# Patient Record
Sex: FEMALE | Race: WHITE | NOT HISPANIC OR LATINO | Employment: UNEMPLOYED | URBAN - METROPOLITAN AREA
[De-identification: names, ages, dates, MRNs, and addresses within clinical notes are randomized per-mention and may not be internally consistent; named-entity substitution may affect disease eponyms.]

---

## 2019-09-05 PROBLEM — R76.8 POSITIVE ANA (ANTINUCLEAR ANTIBODY): Status: ACTIVE | Noted: 2019-09-05

## 2019-09-05 PROBLEM — M15.0 PRIMARY OSTEOARTHRITIS INVOLVING MULTIPLE JOINTS: Status: ACTIVE | Noted: 2019-09-05

## 2019-09-05 PROBLEM — M15.9 PRIMARY OSTEOARTHRITIS INVOLVING MULTIPLE JOINTS: Status: ACTIVE | Noted: 2019-09-05

## 2019-09-05 PROBLEM — L40.9 PSORIASIS: Status: ACTIVE | Noted: 2019-09-05

## 2019-12-05 PROBLEM — Z79.631 METHOTREXATE, LONG TERM, CURRENT USE: Status: ACTIVE | Noted: 2019-12-05

## 2020-04-23 ENCOUNTER — TELEPHONE (OUTPATIENT)
Dept: PHARMACY | Facility: CLINIC | Age: 50
End: 2020-04-23

## 2020-04-23 PROBLEM — L40.50 PSORIATIC ARTHRITIS: Status: ACTIVE | Noted: 2019-09-05

## 2020-04-27 NOTE — TELEPHONE ENCOUNTER
DOCUMENTATION ONLY:  Prior authorization for Cosentyx approved from 04/23/20 to 10/23/20    Case Id: [id number]    Co-pay: $0    Patient Assistance IS required

## 2020-04-28 NOTE — TELEPHONE ENCOUNTER
Initial Cosentyx SensoReady Pens 150mg consult completed on . Cosentyx SensoReady Pens 150mg will be shipped on  to arrive at patient's home on  via [employer]. $0 copay. Patient will start Cosentyx SensoReady Pens 150mg on . Address confirmed, CC on file. Confirmed 2 patient identifiers - name and . Therapy Appropriate.    - Cosentyx SensoReady Pens 150mg:  Inject 1 pen (150mg) every week x 5 weeks, then 1 pen (150mg) every 4 weeks.    -Storage: Refrigerate between 36-46 degrees Fahrenheit  Use within ONE HOUR of taking out of fridge.    -Injection technique:  - Wash hands before and after injection.  - Monthly RX will come with gauze, bandaids, and alcohol swabs.  - Patient may inject in either the tops of the thighs, abdomen- but at least 2 inches away from her belly button, or the outer part of her upper arm. Patient was instructed to rotate injections sites.  - Examine device to ensure no particulates, cloudiness, etc.  - Patient is to wipe down the injection site with the alcohol pad, wait to dry.  Gently squeeze the area of the cleaned skin and hold it firmly. Place the pen flat at a 90 degree angle against the raised area of skin that is being squeezed, and then push down firmly on the pen to start the injection. The 1st 'click' indicates the start and the second 'click' indicates that the injection is almost complete. A green indicator will fill the window when completed, and pen can be removed.  - Patient will use sharps container; once full, per [place] law, she may lock the sharps container and place in her trash. She can then contact the Pharmacy and we will replace the sharps at no additional charge.    -Potential Side effects:  Injection site reactions, diarrhea, cold like symptoms.  Patient advised to call if any signs of allergic reaction, infection, or use of live vaccines.    -DDI: Medication list reviewed and potential DDIs addressed.  Patient verbalized understanding. Compliance  stressed. Patient advised to keep a calendar marking dates of injections to ensure better compliance. Patient advised to call myself or provider should any questions arise. Patient plans to start Cosentyx SensoReady Pens on 5/2. Consultation included: indication; goals of treatment; administration; storage and handling; side effects; how to handle side effects; the importance of compliance; how to handle missed doses; the importance of laboratory monitoring; the importance of keeping all follow up appointments. Patient understands to report any medication changes to [facility] and provider. All questions answered and addressed to patients satisfaction.    Dimple Meraz, PharmD  [facility]  [phone]

## 2020-05-22 ENCOUNTER — TELEPHONE (OUTPATIENT)
Dept: PHARMACY | Facility: CLINIC | Age: 50
End: 2020-05-22

## 2020-06-26 ENCOUNTER — TELEPHONE (OUTPATIENT)
Dept: PHARMACY | Facility: CLINIC | Age: 50
End: 2020-06-26

## 2020-07-27 ENCOUNTER — TELEPHONE (OUTPATIENT)
Dept: PHARMACY | Facility: CLINIC | Age: 50
End: 2020-07-27

## 2020-08-17 ENCOUNTER — TELEPHONE (OUTPATIENT)
Dept: PHARMACY | Facility: CLINIC | Age: 50
End: 2020-08-17

## 2020-09-22 ENCOUNTER — TELEPHONE (OUTPATIENT)
Dept: PHARMACY | Facility: CLINIC | Age: 50
End: 2020-09-22

## 2020-09-29 ENCOUNTER — TELEPHONE (OUTPATIENT)
Dept: PHARMACY | Facility: CLINIC | Age: 50
End: 2020-09-29

## 2020-10-19 ENCOUNTER — SPECIALTY PHARMACY (OUTPATIENT)
Dept: PHARMACY | Facility: CLINIC | Age: 50
End: 2020-10-19

## 2020-10-21 NOTE — TELEPHONE ENCOUNTER
Specialty Pharmacy - Clinical Reassessment  Specialty Pharmacy - Refill Coordination    Specialty Medication Orders Linked to Encounter      Most Recent Value   Medication #1  secukinumab (COSENTYX PEN) 150 mg/mL PnIj (Order#[id number], Rx#[id number])        Angelina Ramey is a 49 y.o. female, who is followed by the specialty pharmacy service for management and education.    Encounters since last clinical assessment   No encounters found.   Clinical call attempts since last clinical assessment   10/19/2020  5:20 PM - Specialty Pharmacy - Clinical Reassessment by Shane Hogue, Janie     Today she received follow up education for her specialty medication(s).    Current Outpatient Medications   Medication Sig    FLUARIX QUAD [id number], PF, 60 mcg (15 mcg x 4)/0.5 mL Syrg ADM 0.5ML IM UTD    folic acid (FOLVITE) 1 MG tablet Take 1 tablet (1 mg total) by mouth once daily.    glucosamine-chondroitin 500-400 mg tablet Take 1 tablet by mouth 3 (three) times daily.    hydrOXYzine HCL (ATARAX) 25 MG tablet Take 1 tablet (25 mg total) by mouth 3 (three) times daily as needed for Itching.    methotrexate 2.5 MG Tab Take 6 tablets (15 mg total) by mouth every 7 days.    multivitamin (THERAGRAN) per tablet Take 1 tablet by mouth once daily.    secukinumab (COSENTYX PEN) 150 mg/mL PnIj Inject 150 mg into the skin every 28 days.    sertraline (ZOLOFT) 25 MG tablet TK 1 T PO QD    TURMERIC ORAL Take by mouth once daily.    triamcinolone acetonide 0.1% (KENALOG) 0.1 % cream Apply topically 2 (two) times daily. for 10 days   Last reviewed on 10/21/2020 11:49 AM by Shane Hogue, PharmD    Review of patient's allergies indicates:  No Known AllergiesLast reviewed on  10/21/2020 11:49 AM by Shane Hogue    Drug Interactions    Drug interactions evaluated: yes  Clinically relevant drug interactions identified: no  Provided the patient with educational material regarding drug interactions: not applicable    "    Medication Adherence    Patient reported X missed doses in the last month: 0  Any gaps in refill history greater than 2 weeks in the last 3 months: no  Demonstrates understanding of importance of adherence: yes  Informant: patient  Reliability of informant: reliable  Provider-estimated medication adherence level: good  Reasons for non-adherence: no problems identified  Adherence tools used: medication list  Support network for adherence: family member  Confirmed plan for next specialty medication refill: delivery by pharmacy  Refills needed for supportive medications: not needed       Adverse Effects    Arthralgias: Pos       Assessment Questions - Documented Responses      Most Recent Value   Assessment   Medication Reconciliation completed for patient  Yes   During the past 4 weeks, has patient missed any activities due to condition or medication?  No   During the past 4 weeks, did patient have any of the following urgent care visits?  None   Goals of Therapy Status  Achieving   Welcome packet contents reviewed and discussed with patient?  No   Assesment completed?  Yes   Plan  Therapy continued   Do you need to open a clinical intervention (i-vent)?  No   Do you want to schedule first shipment?  No   Medication #1 Assessment Info   Patient status  Existing medication, Exisiting to [facility]   Is this medication appropriate for the patient?  Yes   Is this medication effective?  Yes          Objective    She has a past medical history of Mental disorder.    Tried/failed medications: MTX    BP Readings from Last 4 Encounters:   10/07/20 134/79   09/29/20 127/86   08/13/20 112/68   06/12/20 118/69     Ht Readings from Last 4 Encounters:   10/07/20 5' 4" (1.626 m)   08/13/20 5' 4" (1.626 m)   06/12/20 5' 4" (1.626 m)   05/07/20 5' 4" (1.626 m)     Wt Readings from Last 4 Encounters:   10/07/20 65 kg (143 lb 3.2 oz)   08/13/20 63 kg (138 lb 12.8 oz)   06/12/20 62.8 kg (138 lb 6.4 oz)   05/07/20 63.5 kg (140 lb) "     Recent Labs   Lab Result Units 08/13/20  1028   Creatinine mg/dL 0.72   ALT U/L 80 H   AST U/L 22     The goals of prescribed drug therapy management include:  · Supporting patient to meet the prescriber's medical treatment objectives  · Improving or maintaining quality of life  · Maintaining optimal therapy adherence  · Minimizing and managing side effects      Goals of Therapy Status: Achieving    Assessment/Plan  Patient plans to continue therapy without changes      Indication, dosage, appropriateness, effectiveness, safety and convenience of her specialty medication(s) were reviewed today.     Patient Counseling    Counseled the patient on the following: doses and administration discussed, safe handling, storage, and disposal discussed, possible adverse effects and management discussed, possible drug and prescription drug interactions discussed, possible drug and OTC drug and food interactions discussed, lab monitoring and follow-up discussed, therapeutic rationale discussed, cost of medications and cost implications discussed, adherence and missed doses discussed, pharmacy contact information discussed       Cosentyx refill and follow up assessment were completed on 10/21/20. Pt's PsA symptoms have improved since starting Cosentyx. Prior to stating Cosentyx, it was a major chore for the pt to stand from a sitting position. She had to use her arms to pull up from a chair. Getting up is much easier now. Pt reports occasional 4/10 pain, affecting her hands and feet. She states that she has experienced less pain lately, because there has been less yard work to do. Pt still experiences pain and stiffness after tasks such as bathing her dogs, which requires exertion. Pt does not have an infection. Pt's medication list was reviewed and reconciled. No DDIs. She verified the dose and frequency of her maintenance and PRN medications. Pt denies new conditions, new allergies, new medications,  and recent ER / urgent  care visits. She does not work, and has missed any planned activities. Pt has not experienced any side effects from Cosentyx. She has not missed any doses. Pt writes down the date of her injection on her medication list, and counts on [facility] to call her for refills. She stores Cosentyx in the refrigerator, and know that it is stable for 1 hour at room temperature. Pt has no issues with self injection or any other aspect of the treatment plan. Pt confirmed shipping of Cosentyx on 10/28 for delivery on 10/29. She will inject on 10/31. Pt address and  verified. $0.00 copayment in 004. Pt had no further questions or concerns.Specialty Pharmacy - Clinical Reassessment  Specialty Pharmacy - Refill Coordination    Specialty Medication Orders Linked to Encounter      Most Recent Value   Medication #1  secukinumab (COSENTYX PEN) 150 mg/mL PnIj (Order#[id number], Rx#[id number])          Refill Questions - Documented Responses      Most Recent Value   Relationship to patient of person spoken to?  Self   HIPAA/medical authority confirmed?  Yes   Any changes in contact preferences or allowed representatives?  No   Has the patient had any insurance changes?  No   Has the patient had any changes to specialty medication, dose, or instructions?  No   Has the patient started taking any new medications, herbals, or supplements?  No   Has the patient been diagnosed with any new medical conditions?  No   Does the patient have any new allergies to medications or foods?  No   Does the patient have any concerns about side effects?  No   Can the patient store medication/sharps container properly (at the correct temperature, away from children/pets, etc.)?  Yes   Can the patient call emergency services (911) in the event of an emergency?  Yes   Does the patient have any concerns or questions about taking or administering this medication as prescribed?  No   How many doses did the patient miss in the past 4 weeks or since the last fill?  0    How many doses does the patient have on hand?  0   Does the number of doses/days supply remaining match pharmacy expected amounts?  Yes   How will the patient receive the medication?  Mail   When does the patient need to receive the medication?  10/31/20   Shipping Address  Home   Address in [facility] confirmed and updated if neccessary?  Yes   Expected Copay ($)  0   Is the patient able to afford the medication copay?  Yes   Payment Method  zero copay   Days supply of Refill  28   Would patient like to speak to a pharmacist?  No   Do you want to trigger an intervention?  No   Do you want to trigger an additional referral task?  No   Refill activity completed?  Yes   Refill activity plan  Refill scheduled   Shipment/Pickup Date:  10/28/20          Current Outpatient Medications   Medication Sig    FLUARIX QUAD [id number], PF, 60 mcg (15 mcg x 4)/0.5 mL Syrg ADM 0.5ML IM UTD    folic acid (FOLVITE) 1 MG tablet Take 1 tablet (1 mg total) by mouth once daily.    glucosamine-chondroitin 500-400 mg tablet Take 1 tablet by mouth 3 (three) times daily.    hydrOXYzine HCL (ATARAX) 25 MG tablet Take 1 tablet (25 mg total) by mouth 3 (three) times daily as needed for Itching.    methotrexate 2.5 MG Tab Take 6 tablets (15 mg total) by mouth every 7 days.    multivitamin (THERAGRAN) per tablet Take 1 tablet by mouth once daily.    secukinumab (COSENTYX PEN) 150 mg/mL PnIj Inject 150 mg into the skin every 28 days.    sertraline (ZOLOFT) 25 MG tablet TK 1 T PO QD    TURMERIC ORAL Take by mouth once daily.    triamcinolone acetonide 0.1% (KENALOG) 0.1 % cream Apply topically 2 (two) times daily. for 10 days   Last reviewed on 10/21/2020 11:49 AM by Shane Hogue, PharmD    Review of patient's allergies indicates:  No Known Allergies Last reviewed on  10/21/2020 11:49 AM by Shane Hogue      Tasks added this encounter   1/12/2021 - Clinical - Follow Up Assesement (90 day)   Tasks due within next 3 months   No  tasks due.     Janie Lynn  [facility]  [address] [facility] [address]  Phone: [phone]  Fax: [phone]        Tasks added this encounter   1/12/2021 - Clinical - Follow Up Assesement (90 day)   Tasks due within next 3 months   No tasks due.     Janie Lynn  [facility]  [address] [facility] [address]  Phone: [phone]  Fax: [phone]

## 2020-12-10 ENCOUNTER — SPECIALTY PHARMACY (OUTPATIENT)
Dept: PHARMACY | Facility: CLINIC | Age: 50
End: 2020-12-10

## 2020-12-10 NOTE — TELEPHONE ENCOUNTER
Specialty Pharmacy - Refill Coordination    Specialty Medication Orders Linked to Encounter      Most Recent Value   Medication #1  secukinumab (COSENTYX PEN) 150 mg/mL PnIj (Order#[id number], Rx#[id number])          Refill Questions - Documented Responses      Most Recent Value   Relationship to patient of person spoken to?  Self   HIPAA/medical authority confirmed?  Yes   Any changes in contact preferences or allowed representatives?  No   Has the patient had any insurance changes?  No   Has the patient had any changes to specialty medication, dose, or instructions?  No   Has the patient started taking any new medications, herbals, or supplements?  No   Has the patient been diagnosed with any new medical conditions?  No   Does the patient have any new allergies to medications or foods?  No   Does the patient have any concerns about side effects?  No   Can the patient store medication/sharps container properly (at the correct temperature, away from children/pets, etc.)?  Yes   Can the patient call emergency services (911) in the event of an emergency?  Yes   Does the patient have any concerns or questions about taking or administering this medication as prescribed?  No   How many doses does the patient have on hand?  0   How many days does the patient report on hand quantity will last?  0   Does the number of doses/days supply remaining match pharmacy expected amounts?  Yes   How will the patient receive the medication?  Mail   When does the patient need to receive the medication?  12/15/20   Shipping Address  Home   Address in [facility] confirmed and updated if neccessary?  Yes   Expected Copay ($)  0   Is the patient able to afford the medication copay?  Yes   Payment Method  zero copay   Days supply of Refill  28   Would patient like to speak to a pharmacist?  No   Do you want to trigger an intervention?  No   Do you want to trigger an additional referral task?  No   Refill activity completed?  Yes    Refill activity plan  Refill scheduled   Shipment/Pickup Date:  12/14/20          Current Outpatient Medications   Medication Sig    FLUARIX QUAD [id number], PF, 60 mcg (15 mcg x 4)/0.5 mL Syrg ADM 0.5ML IM UTD    folic acid (FOLVITE) 1 MG tablet Take 1 tablet (1 mg total) by mouth once daily.    glucosamine-chondroitin 500-400 mg tablet Take 1 tablet by mouth 3 (three) times daily.    hydrOXYzine HCL (ATARAX) 25 MG tablet Take 1 tablet (25 mg total) by mouth 3 (three) times daily as needed for Itching.    methotrexate 2.5 MG Tab Take 6 tablets (15 mg total) by mouth every 7 days.    multivitamin (THERAGRAN) per tablet Take 1 tablet by mouth once daily.    secukinumab (COSENTYX PEN) 150 mg/mL PnIj Inject 150 mg into the skin every 28 days.    sertraline (ZOLOFT) 50 MG tablet Take 50 mg by mouth once daily.    triamcinolone acetonide 0.1% (KENALOG) 0.1 % cream Apply topically 2 (two) times daily. for 10 days    TURMERIC ORAL Take by mouth once daily.   Last reviewed on 12/10/2020  5:47 PM by Racheal Shay    Review of patient's allergies indicates:  No Known Allergies Last reviewed on  12/10/2020 5:47 PM by Racheal Shay      Tasks added this encounter   No tasks added.   Tasks due within next 3 months   1/12/2021 - Clinical - Follow Up Assesement (90 day)  11/20/2020 - Refill Call (Auto Added)     Racheal Shay  [facility]  [address] [place] [address]  Phone: [phone]  Fax: [phone]

## 2020-12-11 NOTE — TELEPHONE ENCOUNTER
Confirmed with Katrin MORALES and fulfillment that [facility] can ship to arrive 12/12. Patient confirmed address and shipping date.

## 2021-01-06 ENCOUNTER — SPECIALTY PHARMACY (OUTPATIENT)
Dept: PHARMACY | Facility: CLINIC | Age: 51
End: 2021-01-06

## 2021-02-11 ENCOUNTER — PATIENT MESSAGE (OUTPATIENT)
Dept: PHARMACY | Facility: CLINIC | Age: 51
End: 2021-02-11

## 2021-02-12 ENCOUNTER — SPECIALTY PHARMACY (OUTPATIENT)
Dept: PHARMACY | Facility: CLINIC | Age: 51
End: 2021-02-12

## 2021-03-02 ENCOUNTER — PATIENT MESSAGE (OUTPATIENT)
Dept: PHARMACY | Facility: CLINIC | Age: 51
End: 2021-03-02

## 2021-05-19 ENCOUNTER — PATIENT MESSAGE (OUTPATIENT)
Dept: PHARMACY | Facility: CLINIC | Age: 51
End: 2021-05-19

## 2021-05-19 ENCOUNTER — SPECIALTY PHARMACY (OUTPATIENT)
Dept: PHARMACY | Facility: CLINIC | Age: 51
End: 2021-05-19

## 2022-01-25 ENCOUNTER — SPECIALTY PHARMACY (OUTPATIENT)
Dept: PHARMACY | Facility: CLINIC | Age: 52
End: 2022-01-25

## 2022-02-08 NOTE — TELEPHONE ENCOUNTER
Per encounter from 02/07:     Called to discuss obtaining hep panel and T spot at PCP for humira. Patient stated she is still concerned about risk of infections and would like to hold off on them for now.     Ginny Glover MD  Rheumatology Fellow  Pager# [phone]

## 2022-07-07 PROBLEM — Z79.631 METHOTREXATE, LONG TERM, CURRENT USE: Status: RESOLVED | Noted: 2019-12-05 | Resolved: 2022-07-07

## 2022-10-17 PROBLEM — Z12.11 COLON CANCER SCREENING: Status: ACTIVE | Noted: 2022-10-17

## 2022-12-09 ENCOUNTER — SPECIALTY PHARMACY (OUTPATIENT)
Dept: PHARMACY | Facility: CLINIC | Age: 52
End: 2022-12-09

## 2022-12-13 NOTE — TELEPHONE ENCOUNTER
PA submitted via [facility] web portal  Key: [id number]  PA Case ID: [id number]

## 2022-12-19 ENCOUNTER — PATIENT MESSAGE (OUTPATIENT)
Dept: PHARMACY | Facility: CLINIC | Age: 52
End: 2022-12-19

## 2022-12-22 ENCOUNTER — SPECIALTY PHARMACY (OUTPATIENT)
Dept: PHARMACY | Facility: CLINIC | Age: 52
End: 2022-12-22

## 2022-12-22 NOTE — TELEPHONE ENCOUNTER
Multiple unsuccessful call attempts to patient regarding Humira. Will close out encounter at this time and route encounter to MD.

## 2023-01-13 NOTE — TELEPHONE ENCOUNTER
Incoming call from patient stating that she has not been able to return [facility]'s calls due to not having a phone. She still does not have a phone, but wanted to inform [facility]. Her insurance plan has changed to another medicaid plan ([employer]), however she stated that she will be switching back to [facility] in February and will reach out to [facility] when that plan is reactivated to initiate Humira.

## 2023-02-06 ENCOUNTER — SPECIALTY PHARMACY (OUTPATIENT)
Dept: PHARMACY | Facility: CLINIC | Age: 53
End: 2023-02-06

## 2023-02-06 DIAGNOSIS — L40.50 PSORIATIC ARTHRITIS: Primary | ICD-10-CM

## 2023-02-06 NOTE — TELEPHONE ENCOUNTER
Specialty Pharmacy - Initial Clinical Assessment    Specialty Medication Orders Linked to Encounter      Flowsheet Row Most Recent Value   Medication #1 adalimumab (HUMIRA,CF, PEN) 40 mg/0.4 mL PnKt (Order#[id number], Rx#[id number])          Patient Diagnosis   L40.50 - Psoriatic arthritis    Subjective    Dilcia Ramey is a 52 y.o. female, who is followed by the specialty pharmacy service for management and education.    Recent Encounters       Date Type Provider Description    02/06/2023 Specialty Pharmacy Muna Bernabe PharmD Initial Clinical Assessment    12/22/2022 Specialty Pharmacy Muna Bernabe PharmD Clinical Intervention    12/09/2022 Specialty Pharmacy Muna Bernabe PharmD Referral Authorization    01/25/2022 Specialty Pharmacy Kan Salazar PharmD Referral Authorization    05/19/2021 Specialty Pharmacy Karime Munroe, Janie Referral Authorization          Clinical call attempts since last clinical assessment   12/16/2022  1:42 PM - Specialty Pharmacy - Clinical Assessment by Muna Bernabe PharmD  12/19/2022  1:25 PM - Specialty Pharmacy - Clinical Assessment by Muna Bernabe PharmD  12/19/2022  1:26 PM - Specialty Pharmacy - Clinical Assessment by Muna Bernabe PharmD  12/22/2022  9:44 AM - Specialty Pharmacy - Clinical Assessment by Muna Bernabe PharmD     Current Outpatient Medications   Medication Sig Note    adalimumab (HUMIRA,CF, PEN) 40 mg/0.4 mL PnKt Inject 0.4 mLs (40 mg total) into the skin every 14 (fourteen) days.     calcium-vitamin D3 (OS-GORGE 500 + D3) 500 mg-5 mcg (200 unit) per tablet Take 1 tablet by mouth 2 (two) times daily with meals.     FLUARIX QUAD 6911-0299, PF, 60 mcg (15 mcg x 4)/0.5 mL Syrg ADM 0.5ML IM UTD     gabapentin (NEURONTIN) 300 MG capsule Take 300 mg by mouth 2 (two) times daily.     GAMMA-AMINOBUTYRIC ACID, BULK, MISC 750 mg by Misc.(Non-Drug; Combo Route) route once daily. 2/6/2023: Patient reports not taking.    glucosamine-chondroitin  500-400 mg tablet Take 1 tablet by mouth 3 (three) times daily. 2/6/2023: Patient reports taking daily.    MEDROL, JOHNNY, 4 mg tablet Take 1 tablet by mouth.     multivitamin (THERAGRAN) per tablet Take 1 tablet by mouth once daily.     Panax ginseng root (PANAX GINSENG ORAL) Take 500 mg by mouth once daily. 2/6/2023: Patient reports not taking.    triamcinolone acetonide 0.1% (KENALOG) 0.1 % cream Apply topically 2 (two) times daily. for 10 days     TURMERIC ORAL Take by mouth once daily.    Last reviewed on 2/6/2023 11:59 AM by Muna Bernabe, Janie    Review of patient's allergies indicates:  No Known AllergiesLast reviewed on  2/6/2023 11:59 AM by Muna Bernabe    Drug Interactions    Drug interactions evaluated: yes  Clinically relevant drug interactions identified: no  Provided the patient with educational material regarding drug interactions: not applicable       Medication Adherence    Adherence tools used: medication list  Support network for adherence: family member       Adverse Effects    Pruritus: Pos  Rash: Pos  Ulcers/sores: Pos  Arthralgias: Pos  Back pain: Pos  Gait problem: Pos  Joint swelling: Pos  Neck pain: Pos  Neck stiffness: Pos  *All other systems reviewed and are negative       Assessment Questions - Documented Responses      Flowsheet Row Most Recent Value   Assessment    Medication Reconciliation completed for patient Yes   During the past 4 weeks, has patient missed any activities due to condition or medication? No   During the past 4 weeks, did patient have any of the following urgent care visits? None   Goals of Therapy Status Discussed (new start)   Status of the patients ability to self-administer: Is Able   All education points have been covered with patient? Yes, supplemental printed education provided   Welcome packet contents reviewed and discussed with patient? Yes   Assesment completed? Yes   Plan Therapy being initiated   Do you need to open a clinical intervention  "(i-vent)? No   Do you want to schedule first shipment? Yes          Refill Questions - Documented Responses      Flowsheet Row Most Recent Value   Refill Screening Questions    When does the patient need to receive the medication? 02/08/23   Refill Delivery Questions    How will the patient receive the medication? Mail   When does the patient need to receive the medication? 02/08/23   Shipping Address Home   Address in [facility] confirmed and updated if neccessary? Yes   Expected Copay ($) 0   Is the patient able to afford the medication copay? Yes   Payment Method zero copay   Days supply of Refill 28   Supplies needed? No supplies needed   Refill activity completed? Yes   Refill activity plan Refill scheduled   Shipment/Pickup Date: 02/07/23            Objective    She has a past medical history of Mental disorder.    Tried/failed medications: Methotrexate, Cosentyx    BP Readings from Last 4 Encounters:   12/08/22 132/61   10/17/22 (!) 93/54   10/11/22 (!) 143/81   09/01/22 117/69     Ht Readings from Last 4 Encounters:   12/08/22 5' 4" (1.626 m)   10/11/22 5' 4" (1.626 m)   09/01/22 5' 4" (1.626 m)   07/07/22 5' 4" (1.626 m)     Wt Readings from Last 4 Encounters:   12/08/22 65.4 kg (144 lb 1.6 oz)   10/11/22 66.2 kg (146 lb)   09/01/22 66 kg (145 lb 8 oz)   08/22/22 67.1 kg (148 lb)     Recent Labs   Lab Result Units 12/08/22  1135   Creatinine mg/dL 0.7   ALT U/L 51 H   AST U/L 20   Hepatitis B Surface Ag  Non-reactive     The goals of prescribed drug therapy management include:  Supporting patient to meet the prescriber's medical treatment objectives  Improving or maintaining quality of life  Maintaining optimal therapy adherence  Minimizing and managing side effects      Goals of Therapy Status: Discussed (new start)    Assessment/Plan  Patient plans to start therapy on 02/08/23      Indication, dosage, appropriateness, effectiveness, safety and convenience of her specialty medication(s) were " reviewed today.     Patient Education   Patient received education on the following:   Expectations and possible outcomes of therapy  Proper use, timely administration, and missed dose management  Duration of therapy  Side effects, including prevention, minimization, and management  Contraindications and safety precautions  New or changed medications, including prescribe and over the counter medications and supplements  Reviews recommended vaccinations, as appropriate  Storage, safe handling, and disposal      Tasks added this encounter   3/1/2023 - Refill Call (Auto Added)  11/6/2023 - Clinical - Follow Up Assesement (Annual)   Tasks due within next 3 months   No tasks due.     Muna Bernabe, PharmD  [facility]  [address] [facility] [address]  Phone: [phone]  Fax: [phone]

## 2023-03-01 ENCOUNTER — SPECIALTY PHARMACY (OUTPATIENT)
Dept: PHARMACY | Facility: CLINIC | Age: 53
End: 2023-03-01

## 2023-03-01 NOTE — TELEPHONE ENCOUNTER
Specialty Pharmacy - Refill Coordination    Specialty Medication Orders Linked to Encounter      Flowsheet Row Most Recent Value   Medication #1 adalimumab (HUMIRA,CF, PEN) 40 mg/0.4 mL PnKt (Order#[id number], Rx#[id number])            Refill Questions - Documented Responses      Flowsheet Row Most Recent Value   Patient Availability and HIPAA Verification    Does patient want to proceed with activity? Yes   HIPAA/medical authority confirmed? Yes   Relationship to patient of person spoken to? Self   Refill Screening Questions    Changes to allergies? No   Changes to medications? No   New conditions since last clinic visit? No   Unplanned office visit, urgent care, ED, or hospital admission in the last 4 weeks? No   How does patient/caregiver feel medication is working? Too soon to tell   Financial problems or insurance changes? No   How many doses of your specialty medications were missed in the last 4 weeks? 0   Would patient like to speak to a pharmacist? No   When does the patient need to receive the medication? 03/01/23   Refill Delivery Questions    How will the patient receive the medication? Mail   When does the patient need to receive the medication? 03/01/23   Shipping Address Prescription   Address in [facility] confirmed and updated if neccessary? Yes   Expected Copay ($) 0   Is the patient able to afford the medication copay? Yes   Payment Method zero copay   Days supply of Refill 28   Supplies needed? No supplies needed   Refill activity completed? Yes   Refill activity plan Refill scheduled   Shipment/Pickup Date: 03/02/23            Current Outpatient Medications   Medication Sig    adalimumab (HUMIRA,CF, PEN) 40 mg/0.4 mL PnKt Inject 0.4 mLs (40 mg total) into the skin every 14 (fourteen) days.    calcium-vitamin D3 (OS-GORGE 500 + D3) 500 mg-5 mcg (200 unit) per tablet Take 1 tablet by mouth 2 (two) times daily with meals.    EScitalopram oxalate (LEXAPRO) 10 MG tablet Take 1 tablet (10 mg  total) by mouth once daily.    FLUARIX QUAD 1106-0681, PF, 60 mcg (15 mcg x 4)/0.5 mL Syrg ADM 0.5ML IM UTD    gabapentin (NEURONTIN) 300 MG capsule Take 300 mg by mouth 2 (two) times daily.    GAMMA-AMINOBUTYRIC ACID, BULK, MISC 750 mg by Misc.(Non-Drug; Combo Route) route once daily.    glucosamine-chondroitin 500-400 mg tablet Take 1 tablet by mouth 3 (three) times daily.    MEDROL, JOHNNY, 4 mg tablet Take 1 tablet by mouth.    multivitamin (THERAGRAN) per tablet Take 1 tablet by mouth once daily.    Panax ginseng root (PANAX GINSENG ORAL) Take 500 mg by mouth once daily.    triamcinolone acetonide 0.1% (KENALOG) 0.1 % cream Apply topically 2 (two) times daily. for 10 days    TURMERIC ORAL Take by mouth once daily.   Last reviewed on 2/24/2023  1:58 PM by Farnaz Mcdowell LPN    Review of patient's allergies indicates:  No Known Allergies Last reviewed on  2/24/2023 1:58 PM by Farnaz Mcdowell      Tasks added this encounter   3/22/2023 - Refill Call (Auto Added)   Tasks due within next 3 months   No tasks due.     [facility]  [address] [facility] [address]  [facility] [address]  Phone: [phone]  Fax: [phone]

## 2023-03-22 ENCOUNTER — SPECIALTY PHARMACY (OUTPATIENT)
Dept: PHARMACY | Facility: CLINIC | Age: 53
End: 2023-03-22

## 2023-03-22 NOTE — TELEPHONE ENCOUNTER
Outgoing call: patient is sick and will be injecting again on 3/25. [facility] will follow up closer to next needed injection.

## 2023-03-30 NOTE — TELEPHONE ENCOUNTER
Specialty Pharmacy - Refill Coordination    Specialty Medication Orders Linked to Encounter      Flowsheet Row Most Recent Value   Medication #1 adalimumab (HUMIRA,CF, PEN) 40 mg/0.4 mL PnKt (Order#[id number], Rx#[id number])            Refill Questions - Documented Responses      Flowsheet Row Most Recent Value   Patient Availability and HIPAA Verification    Does patient want to proceed with activity? Yes   HIPAA/medical authority confirmed? Yes   Relationship to patient of person spoken to? Self   Refill Screening Questions    Changes to allergies? No   Changes to medications? No   New conditions since last clinic visit? No   Unplanned office visit, urgent care, ED, or hospital admission in the last 4 weeks? No   How does patient/caregiver feel medication is working? Too soon to tell   Financial problems or insurance changes? No   How many doses of your specialty medications were missed in the last 4 weeks? 0   Would patient like to speak to a pharmacist? No   When does the patient need to receive the medication? 04/08/23   Refill Delivery Questions    How will the patient receive the medication? Mail   When does the patient need to receive the medication? 04/08/23   Shipping Address Home   Address in [facility] confirmed and updated if neccessary? Yes   Expected Copay ($) 0   Is the patient able to afford the medication copay? Yes   Payment Method zero copay   Days supply of Refill 28   Supplies needed? --  [Inj kit]   Refill activity completed? Yes   Refill activity plan Refill scheduled   Shipment/Pickup Date: 04/04/23            Current Outpatient Medications   Medication Sig    adalimumab (HUMIRA,CF, PEN) 40 mg/0.4 mL PnKt Inject 0.4 mLs (40 mg total) into the skin every 14 (fourteen) days.    calcium-vitamin D3 (OS-GORGE 500 + D3) 500 mg-5 mcg (200 unit) per tablet Take 1 tablet by mouth 2 (two) times daily with meals.    EScitalopram oxalate (LEXAPRO) 10 MG tablet Take 1 tablet (10 mg total) by mouth  once daily.    FLUARIX QUAD 5669-8515, PF, 60 mcg (15 mcg x 4)/0.5 mL Syrg ADM 0.5ML IM UTD    gabapentin (NEURONTIN) 300 MG capsule Take 300 mg by mouth 2 (two) times daily.    GAMMA-AMINOBUTYRIC ACID, BULK, MISC 750 mg by Misc.(Non-Drug; Combo Route) route once daily.    glucosamine-chondroitin 500-400 mg tablet Take 1 tablet by mouth 3 (three) times daily.    MEDROL, JOHNNY, 4 mg tablet Take 1 tablet by mouth.    multivitamin (THERAGRAN) per tablet Take 1 tablet by mouth once daily.    Panax ginseng root (PANAX GINSENG ORAL) Take 500 mg by mouth once daily.    triamcinolone acetonide 0.1% (KENALOG) 0.1 % cream Apply topically 2 (two) times daily. for 10 days    TURMERIC ORAL Take by mouth once daily.   Last reviewed on 2/24/2023  1:58 PM by Farnaz Mcdowell LPN    Review of patient's allergies indicates:  No Known Allergies Last reviewed on  2/24/2023 1:58 PM by Farnaz Mcdowell      Tasks added this encounter   No tasks added.   Tasks due within next 3 months   3/22/2023 - Refill Call (Auto Added)     Sven Becker, PharmD  [facility]  [address] [place] [address]  Phone: [phone]  Fax: [phone]

## 2023-05-01 ENCOUNTER — SPECIALTY PHARMACY (OUTPATIENT)
Dept: PHARMACY | Facility: CLINIC | Age: 53
End: 2023-05-01

## 2023-05-01 NOTE — TELEPHONE ENCOUNTER
Outgoing call: Patient is due to inject on 5/8, I informed the patient that a refill request was sent to her MD and once approved [facility] will follow up.

## 2023-05-04 NOTE — TELEPHONE ENCOUNTER
Specialty Pharmacy - Refill Coordination    Specialty Medication Orders Linked to Encounter      Flowsheet Row Most Recent Value   Medication #1 adalimumab (HUMIRA,CF, PEN) 40 mg/0.4 mL PnKt (Order#[id number], Rx#[id number])            Refill Questions - Documented Responses      Flowsheet Row Most Recent Value   Patient Availability and HIPAA Verification    Does patient want to proceed with activity? Yes   HIPAA/medical authority confirmed? Yes   Relationship to patient of person spoken to? Self   Refill Screening Questions    Changes to allergies? No   Changes to medications? No   New conditions since last clinic visit? No   Unplanned office visit, urgent care, ED, or hospital admission in the last 4 weeks? No   How does patient/caregiver feel medication is working? Too soon to tell   Financial problems or insurance changes? No   How many doses of your specialty medications were missed in the last 4 weeks? 1   Would patient like to speak to a pharmacist? No   When does the patient need to receive the medication? 05/08/23   Refill Delivery Questions    How will the patient receive the medication? Mail   When does the patient need to receive the medication? 05/08/23   Shipping Address Prescription   Address in [facility] confirmed and updated if neccessary? Yes   Expected Copay ($) 0   Is the patient able to afford the medication copay? Yes   Payment Method zero copay   Days supply of Refill 28   Supplies needed? No supplies needed   Refill activity completed? Yes   Refill activity plan Refill scheduled   Shipment/Pickup Date: 05/04/23            Current Outpatient Medications   Medication Sig    adalimumab (HUMIRA,CF, PEN) 40 mg/0.4 mL PnKt Inject 0.4 mLs (40 mg total) into the skin every 14 (fourteen) days.    calcium-vitamin D3 (OS-GORGE 500 + D3) 500 mg-5 mcg (200 unit) per tablet Take 1 tablet by mouth 2 (two) times daily with meals.    EScitalopram oxalate (LEXAPRO) 10 MG tablet Take 1 tablet (10 mg  total) by mouth once daily.    FLUARIX QUAD 5639-0137, PF, 60 mcg (15 mcg x 4)/0.5 mL Syrg ADM 0.5ML IM UTD    gabapentin (NEURONTIN) 300 MG capsule Take 300 mg by mouth 2 (two) times daily.    GAMMA-AMINOBUTYRIC ACID, BULK, MISC 750 mg by Misc.(Non-Drug; Combo Route) route once daily.    glucosamine-chondroitin 500-400 mg tablet Take 1 tablet by mouth 3 (three) times daily.    MEDROL, JOHNNY, 4 mg tablet Take 1 tablet by mouth.    multivitamin (THERAGRAN) per tablet Take 1 tablet by mouth once daily.    Panax ginseng root (PANAX GINSENG ORAL) Take 500 mg by mouth once daily.    triamcinolone acetonide 0.1% (KENALOG) 0.1 % cream Apply topically 2 (two) times daily. for 10 days    TURMERIC ORAL Take by mouth once daily.   Last reviewed on 4/20/2023 10:02 AM by Laura Jamison MA    Review of patient's allergies indicates:  No Known Allergies Last reviewed on  4/20/2023 10:02 AM by Laura Jamison      Tasks added this encounter   No tasks added.   Tasks due within next 3 months   5/4/2023 - Refill Coordination Outreach (1 time occurrence)     [facility]  [facility]  [facility] [address]  Phone: [phone]  Fax: [phone]

## 2023-06-05 PROBLEM — M47.812 ARTHROPATHY OF CERVICAL FACET JOINT: Status: ACTIVE | Noted: 2023-06-05

## 2023-06-07 ENCOUNTER — SPECIALTY PHARMACY (OUTPATIENT)
Dept: PHARMACY | Facility: CLINIC | Age: 53
End: 2023-06-07

## 2023-06-07 NOTE — TELEPHONE ENCOUNTER
Patient contacted [facility] for refill. She states she is behind on doses due to a miss fired pen from the previous month. MTP completed regarding medication adherence and education. Patient refill scheduled for delivery. Will reinitiate doses on 6/11

## 2023-06-07 NOTE — TELEPHONE ENCOUNTER
Specialty Pharmacy - Refill Coordination    Specialty Medication Orders Linked to Encounter      Flowsheet Row Most Recent Value   Medication #1 adalimumab (HUMIRA,CF, PEN) 40 mg/0.4 mL PnKt (Order#[id number], Rx#[id number])            Refill Questions - Documented Responses      Flowsheet Row Most Recent Value   Patient Availability and HIPAA Verification    Does patient want to proceed with activity? Yes   HIPAA/medical authority confirmed? Yes   Relationship to patient of person spoken to? Self   Refill Screening Questions    Changes to allergies? No   Changes to medications? No   New conditions since last clinic visit? No   Unplanned office visit, urgent care, ED, or hospital admission in the last 4 weeks? No   How does patient/caregiver feel medication is working? Fair  [has not had a dose since 5/8]   Financial problems or insurance changes? No   How many doses of your specialty medications were missed in the last 4 weeks? 2   Would patient like to speak to a pharmacist? No   When does the patient need to receive the medication? 06/11/23   Refill Delivery Questions    How will the patient receive the medication? Mail   When does the patient need to receive the medication? 06/11/23   Shipping Address Home   Address in [facility] confirmed and updated if neccessary? Yes   Expected Copay ($) 0   Is the patient able to afford the medication copay? Yes   Payment Method zero copay   Days supply of Refill 28   Supplies needed? No supplies needed   Refill activity completed? Yes   Refill activity plan Refill scheduled   Shipment/Pickup Date: 06/11/23            Current Outpatient Medications   Medication Sig    adalimumab (HUMIRA,CF, PEN) 40 mg/0.4 mL PnKt Inject 0.4 mLs (40 mg total) into the skin every 14 (fourteen) days.    calcium-vitamin D3 (OS-GORGE 500 + D3) 500 mg-5 mcg (200 unit) per tablet Take 1 tablet by mouth 2 (two) times daily with meals.    EScitalopram oxalate (LEXAPRO) 10 MG tablet Take 1 tablet  (10 mg total) by mouth once daily.    FLUARIX QUAD 9608-1077, PF, 60 mcg (15 mcg x 4)/0.5 mL Syrg ADM 0.5ML IM UTD    gabapentin (NEURONTIN) 300 MG capsule Take 300 mg by mouth 2 (two) times daily.    GAMMA-AMINOBUTYRIC ACID, BULK, MISC 750 mg by Misc.(Non-Drug; Combo Route) route once daily.    glucosamine-chondroitin 500-400 mg tablet Take 1 tablet by mouth 3 (three) times daily.    MEDROL, JOHNNY, 4 mg tablet Take 1 tablet by mouth.    multivitamin (THERAGRAN) per tablet Take 1 tablet by mouth once daily.    Panax ginseng root (PANAX GINSENG ORAL) Take 500 mg by mouth once daily.    triamcinolone acetonide 0.1% (KENALOG) 0.1 % cream Apply topically 2 (two) times daily. for 10 days    TURMERIC ORAL Take by mouth once daily.   Last reviewed on 6/5/2023  7:47 AM by Catarina Franco RN    Review of patient's allergies indicates:  No Known Allergies Last reviewed on  6/5/2023 7:44 AM by Catarina Franco      Tasks added this encounter   No tasks added.   Tasks due within next 3 months   6/7/2023 - Refill Coordination Outreach (1 time occurrence)     Cb Rodriguez, PharmD  [facility]  [address] [place] [address]  Phone: [phone]  Fax: [phone]

## 2023-06-28 ENCOUNTER — SPECIALTY PHARMACY (OUTPATIENT)
Dept: PHARMACY | Facility: CLINIC | Age: 53
End: 2023-06-28

## 2023-06-28 PROBLEM — R68.81 EARLY SATIETY: Status: ACTIVE | Noted: 2023-06-28

## 2023-06-28 PROBLEM — R10.9 ABDOMINAL PAIN: Status: ACTIVE | Noted: 2023-06-28

## 2023-06-28 NOTE — TELEPHONE ENCOUNTER
Outgoing call regarding refill Humira. Call to soon by one day. Pt is aware and her next injection is due 7/8. Will follow up for refill 6/29

## 2023-07-06 NOTE — TELEPHONE ENCOUNTER
Specialty Pharmacy - Refill Coordination    Specialty Medication Orders Linked to Encounter      Flowsheet Row Most Recent Value   Medication #1 adalimumab (HUMIRA,CF, PEN) 40 mg/0.4 mL PnKt (Order#[id number], Rx#[id number])            Refill Questions - Documented Responses      Flowsheet Row Most Recent Value   Patient Availability and HIPAA Verification    Does patient want to proceed with activity? Yes   HIPAA/medical authority confirmed? Yes   Relationship to patient of person spoken to? Self   Refill Screening Questions    Changes to allergies? No   Changes to medications? No   New conditions since last clinic visit? No   Unplanned office visit, urgent care, ED, or hospital admission in the last 4 weeks? No   How does patient/caregiver feel medication is working? Fair   Financial problems or insurance changes? No   How many doses of your specialty medications were missed in the last 4 weeks? 2   Would patient like to speak to a pharmacist? No   When does the patient need to receive the medication? 07/08/23   Refill Delivery Questions    How will the patient receive the medication? Mail   When does the patient need to receive the medication? 07/08/23   Shipping Address Home   Address in [facility] confirmed and updated if neccessary? Yes   Expected Copay ($) 0   Is the patient able to afford the medication copay? Yes   Payment Method zero copay   Days supply of Refill 28   Supplies needed? No supplies needed   Refill activity completed? Yes   Refill activity plan Refill scheduled   Shipment/Pickup Date: 07/10/23            Current Outpatient Medications   Medication Sig    adalimumab (HUMIRA,CF, PEN) 40 mg/0.4 mL PnKt Inject 0.4 mLs (40 mg total) into the skin every 14 (fourteen) days.    calcium-vitamin D3 (OS-GORGE 500 + D3) 500 mg-5 mcg (200 unit) per tablet Take 1 tablet by mouth 2 (two) times daily with meals.    EScitalopram oxalate (LEXAPRO) 10 MG tablet Take 1 tablet (10 mg total) by mouth once  daily.    FLUARIX QUAD 0884-2447, PF, 60 mcg (15 mcg x 4)/0.5 mL Syrg ADM 0.5ML IM UTD    gabapentin (NEURONTIN) 300 MG capsule Take 300 mg by mouth 2 (two) times daily.    GAMMA-AMINOBUTYRIC ACID, BULK, MISC 750 mg by Misc.(Non-Drug; Combo Route) route once daily.    glucosamine-chondroitin 500-400 mg tablet Take 1 tablet by mouth 3 (three) times daily.    MEDROL, JOHNNY, 4 mg tablet Take 1 tablet by mouth.    multivitamin (THERAGRAN) per tablet Take 1 tablet by mouth once daily.    Panax ginseng root (PANAX GINSENG ORAL) Take 500 mg by mouth once daily.    triamcinolone acetonide 0.1% (KENALOG) 0.1 % cream Apply topically 2 (two) times daily. for 10 days    TURMERIC ORAL Take by mouth once daily.   Last reviewed on 6/28/2023 12:43 PM by Vashti Bishop MA    Review of patient's allergies indicates:  No Known Allergies Last reviewed on  6/28/2023 12:42 PM by Vashti Bishop      Tasks added this encounter   7/5/2024 - Benefits Review (Annual recurrence)   Tasks due within next 3 months   7/10/2023 - Refill Coordination Outreach (1 time occurrence)     Cb Rodriguez, PharmD  [facility]  [address] [place], [place] [address]  [facility] [address]  Phone: [phone]  Fax: [phone]

## 2023-08-03 ENCOUNTER — SPECIALTY PHARMACY (OUTPATIENT)
Dept: PHARMACY | Facility: CLINIC | Age: 53
End: 2023-08-03

## 2023-08-03 NOTE — TELEPHONE ENCOUNTER
Specialty Pharmacy - Refill Coordination    Specialty Medication Orders Linked to Encounter      Flowsheet Row Most Recent Value   Medication #1 adalimumab (HUMIRA,CF, PEN) 40 mg/0.4 mL PnKt (Order#[id number], Rx#[id number])            Refill Questions - Documented Responses      Flowsheet Row Most Recent Value   Patient Availability and HIPAA Verification    Does patient want to proceed with activity? Yes   HIPAA/medical authority confirmed? Yes   Relationship to patient of person spoken to? Self   Refill Screening Questions    Changes to allergies? No   Changes to medications? No   New conditions since last clinic visit? No   Unplanned office visit, urgent care, ED, or hospital admission in the last 4 weeks? No   How does patient/caregiver feel medication is working? Good   Financial problems or insurance changes? No   How many doses of your specialty medications were missed in the last 4 weeks? 0   Would patient like to speak to a pharmacist? No   When does the patient need to receive the medication? 08/08/23   Refill Delivery Questions    How will the patient receive the medication? Mail   When does the patient need to receive the medication? 08/08/23   Shipping Address Prescription   Address in [facility] confirmed and updated if neccessary? Yes   Expected Copay ($) 3   Is the patient able to afford the medication copay? Yes   Payment Method CC on file   Days supply of Refill 28   Supplies needed? No supplies needed   Refill activity completed? Yes   Refill activity plan Refill scheduled   Shipment/Pickup Date: 08/07/23            Current Outpatient Medications   Medication Sig    adalimumab (HUMIRA,CF, PEN) 40 mg/0.4 mL PnKt Inject 0.4 mLs (40 mg total) into the skin every 14 (fourteen) days.    calcium-vitamin D3 (OS-GORGE 500 + D3) 500 mg-5 mcg (200 unit) per tablet Take 1 tablet by mouth 2 (two) times daily with meals.    DULoxetine (CYMBALTA) 30 MG capsule Take 1 capsule (30 mg total) by mouth once  daily.    EScitalopram oxalate (LEXAPRO) 10 MG tablet Take 1 tablet (10 mg total) by mouth once daily.    FLUARIX QUAD 1017-3629, PF, 60 mcg (15 mcg x 4)/0.5 mL Syrg ADM 0.5ML IM UTD    gabapentin (NEURONTIN) 300 MG capsule Take 300 mg by mouth 2 (two) times daily.    GAMMA-AMINOBUTYRIC ACID, BULK, MISC 750 mg by Misc.(Non-Drug; Combo Route) route once daily.    glucosamine-chondroitin 500-400 mg tablet Take 1 tablet by mouth 3 (three) times daily.    MEDROL, JOHNNY, 4 mg tablet Take 1 tablet by mouth.    multivitamin (THERAGRAN) per tablet Take 1 tablet by mouth once daily.    Panax ginseng root (PANAX GINSENG ORAL) Take 500 mg by mouth once daily.    triamcinolone acetonide 0.1% (KENALOG) 0.1 % cream Apply topically 2 (two) times daily. for 10 days    TURMERIC ORAL Take by mouth once daily.   Last reviewed on 8/3/2023  9:32 AM by Arianne Bee MA    Review of patient's allergies indicates:  No Known Allergies Last reviewed on  8/3/2023 9:32 AM by Arianne Bee      Tasks added this encounter   No tasks added.   Tasks due within next 3 months   No tasks due.     Muna Bernabe, PharmD  [facility]  [address] [place] [id number]  Phone: [phone]  Fax: [phone]

## 2023-08-03 NOTE — TELEPHONE ENCOUNTER
Outgoing call: patient is due to inject on 8/8. Informed pt that refill request was denied: reasons- need an appt. Pt stated she went MD today and that [facility] should resend request. Refill request sent and will follow up once received.

## 2023-09-21 ENCOUNTER — PATIENT MESSAGE (OUTPATIENT)
Dept: PHARMACY | Facility: CLINIC | Age: 53
End: 2023-09-21

## 2023-11-08 PROBLEM — M54.81 OCCIPITAL NEURALGIA OF RIGHT SIDE: Status: ACTIVE | Noted: 2023-11-08

## 2024-03-26 ENCOUNTER — PATIENT MESSAGE (OUTPATIENT)
Dept: ADMINISTRATIVE | Facility: OTHER | Age: 54
End: 2024-03-26

## 2024-08-15 PROBLEM — K58.1 IRRITABLE BOWEL SYNDROME WITH CONSTIPATION: Status: ACTIVE | Noted: 2024-08-15

## 2024-08-15 PROBLEM — R13.14 PHARYNGOESOPHAGEAL DYSPHAGIA: Status: ACTIVE | Noted: 2024-08-15

## 2025-03-05 ENCOUNTER — PATIENT MESSAGE (OUTPATIENT)
Dept: ADMINISTRATIVE | Facility: OTHER | Age: 55
End: 2025-03-05